# Patient Record
Sex: MALE | Race: WHITE | NOT HISPANIC OR LATINO | URBAN - METROPOLITAN AREA
[De-identification: names, ages, dates, MRNs, and addresses within clinical notes are randomized per-mention and may not be internally consistent; named-entity substitution may affect disease eponyms.]

---

## 2017-06-15 ENCOUNTER — IMPORTED ENCOUNTER (OUTPATIENT)
Dept: URBAN - METROPOLITAN AREA CLINIC 31 | Facility: CLINIC | Age: 57
End: 2017-06-15

## 2017-06-15 PROCEDURE — 92310 CONTACT LENS FITTING OU: CPT

## 2017-06-15 PROCEDURE — 92014 COMPRE OPH EXAM EST PT 1/>: CPT

## 2017-06-15 NOTE — PATIENT DISCUSSION
1.  Refractive error2. Continue present contact lens modality with slight power adjustment OS. Stop/wear and call if any redness pain or decrease in vision occur. 3.  Return for an appointment in 1 year for comprehensive exam. with Dr. Devan Bustos.

## 2018-07-05 ENCOUNTER — IMPORTED ENCOUNTER (OUTPATIENT)
Dept: URBAN - METROPOLITAN AREA CLINIC 31 | Facility: CLINIC | Age: 58
End: 2018-07-05

## 2018-07-05 PROCEDURE — 92014 COMPRE OPH EXAM EST PT 1/>: CPT

## 2018-07-05 PROCEDURE — 92310 CONTACT LENS FITTING OU: CPT

## 2018-07-05 NOTE — PATIENT DISCUSSION
1.  Refractive error - Continue present contact lens modality. Stop/wear and call if any redness pain or decrease in vision occur. Glasses change optional. 2.  Return for an appointment in 1 year for comprehensive exam. with Dr. Jane Cheek.

## 2019-07-08 ENCOUNTER — IMPORTED ENCOUNTER (OUTPATIENT)
Dept: URBAN - METROPOLITAN AREA CLINIC 31 | Facility: CLINIC | Age: 59
End: 2019-07-08

## 2019-07-08 PROCEDURE — 92310 CONTACT LENS FITTING OU: CPT

## 2019-07-08 PROCEDURE — 92014 COMPRE OPH EXAM EST PT 1/>: CPT

## 2019-07-08 NOTE — PATIENT DISCUSSION
1.  Continue present contact lens modality. Stop/wear and call if any redness pain or decrease in vision occur. Glasses change optional. 2.  Return for an appointment in 1 year for comprehensive exam. with Dr. Jeana Crowley.

## 2020-06-24 ENCOUNTER — IMPORTED ENCOUNTER (OUTPATIENT)
Dept: URBAN - METROPOLITAN AREA CLINIC 31 | Facility: CLINIC | Age: 60
End: 2020-06-24

## 2020-06-24 PROBLEM — H43.811: Noted: 2020-06-24

## 2020-06-24 PROCEDURE — 92014 COMPRE OPH EXAM EST PT 1/>: CPT

## 2020-06-24 PROCEDURE — 92250 FUNDUS PHOTOGRAPHY W/I&R: CPT

## 2020-06-24 NOTE — PATIENT DISCUSSION
1.  PVD OD: Patient was cautioned to call our office immediately if they experience a substantial change in their symptoms such as an increase in floaters persistent flashes loss of visual field (may appear as a shadow or a curtain) or decrease in visual acuity as these may indicate a retinal tear or detachment. If this is a new problem patient will need to return for re-examination  as determined by the [address]. Return for an appointment in 6 weeks for office call. and refraction with Dr. Miller Gonzalez.

## 2022-04-01 ASSESSMENT — TONOMETRY
OS_IOP_MMHG: 12
OD_IOP_MMHG: 12
OD_IOP_MMHG: 12
OS_IOP_MMHG: 13
OD_IOP_MMHG: 16
OD_IOP_MMHG: 13
OS_IOP_MMHG: 12
OS_IOP_MMHG: 16

## 2022-04-01 ASSESSMENT — VISUAL ACUITY
OD_SC: 20/25
OD_PH: CC 20/25 -2
OS_SC: 20/20-1
OS_CC: J1+
OD_CC: J1+
OD_SC: 20/30
OS_SC: 20/20
OS_SC: 20/25
OD_SC: 20/20

## 2022-07-09 ENCOUNTER — TELEPHONE ENCOUNTER (OUTPATIENT)
Dept: URBAN - METROPOLITAN AREA CLINIC 121 | Facility: CLINIC | Age: 62
End: 2022-07-09

## 2022-07-09 RX ORDER — BENAZEPRIL HYDROCHLORIDE 10 MG/1
TABLET, COATED ORAL
Refills: 0 | OUTPATIENT
Start: 2011-07-15 | End: 2017-08-08

## 2022-07-10 ENCOUNTER — TELEPHONE ENCOUNTER (OUTPATIENT)
Dept: URBAN - METROPOLITAN AREA CLINIC 121 | Facility: CLINIC | Age: 62
End: 2022-07-10

## 2022-07-10 RX ORDER — BENAZEPRIL HYDROCHLORIDE 10 MG/1
TABLET, COATED ORAL ONCE A DAY
Refills: 0 | Status: ACTIVE | COMMUNITY
Start: 2017-08-08